# Patient Record
Sex: FEMALE | Race: WHITE
[De-identification: names, ages, dates, MRNs, and addresses within clinical notes are randomized per-mention and may not be internally consistent; named-entity substitution may affect disease eponyms.]

---

## 2018-04-03 ENCOUNTER — HOSPITAL ENCOUNTER (OUTPATIENT)
Dept: HOSPITAL 56 - MW.SDS | Age: 58
Discharge: HOME | End: 2018-04-03
Attending: SURGERY
Payer: COMMERCIAL

## 2018-04-03 DIAGNOSIS — Z87.891: ICD-10-CM

## 2018-04-03 DIAGNOSIS — K57.30: ICD-10-CM

## 2018-04-03 DIAGNOSIS — D12.3: ICD-10-CM

## 2018-04-03 DIAGNOSIS — Z98.890: ICD-10-CM

## 2018-04-03 DIAGNOSIS — Z86.010: ICD-10-CM

## 2018-04-03 DIAGNOSIS — Z79.899: ICD-10-CM

## 2018-04-03 DIAGNOSIS — Z12.11: Primary | ICD-10-CM

## 2018-04-03 DIAGNOSIS — Z88.2: ICD-10-CM

## 2018-04-03 PROCEDURE — 45380 COLONOSCOPY AND BIOPSY: CPT

## 2018-04-03 NOTE — PCM.PREANE
Preanesthetic Assessment





- Anesthesia/Transfusion/Family Hx


Anesthesia History: Prior Anesthesia Without Reaction


Family History of Anesthesia Reaction: No


Transfusion History: No Prior Transfusion(s)


Intubation History: Unknown





- Review of Systems


General: No Symptoms


Pulmonary: No Symptoms


Cardiovascular: No Symptoms


Gastrointestinal: Other (h/o colon polyps 5 years ago)


Neurological: No Symptoms


Other: Reports: None





- Physical Assessment


Height: 1.65 m


Weight: 55.792 kg


ASA Class: 1


Mental Status: Alert & Oriented x3


Airway Class: Mallampati = 2


Dentition: Reports: Normal Dentition


Thyro-Mental Finger Breadths: 3


Mouth Opening Finger Breadths: 3


ROM/Head Extension: Full


Lungs: Clear to Auscultation, Normal Respiratory Effort


Cardiovascular: Regular Rate, Regular Rhythm





- Allergies


Allergies/Adverse Reactions: 


 Allergies











Allergy/AdvReac Type Severity Reaction Status Date / Time


 


Penicillins Allergy  Rash Verified 03/29/18 10:40


 


Sulfa (Sulfonamide Allergy  Other Verified 03/29/18 10:43





Antibiotics)     














- Blood


Blood Available: No





- Anesthesia Plan


Pre-Op Medication Ordered: None





- Acknowledgements


Anesthesia Type Planned: MAC


Pt an Appropriate Candidate for the Planned Anesthesia: Yes


Alternatives and Risks of Anesthesia Discussed w Pt/Guardian: Yes


Pt/Guardian Understands and Agrees with Anesthesia Plan: Yes





PreAnesthesia Questionnaire


Gastrointestinal History: Reports: Colon Polyp


Neurological History: Reports: Headaches, Chronic





- Past Surgical History


GI Surgical History: Reports: Colonoscopy (x2)


Female  Surgical History: Reports: Breast Biopsy





- SUBSTANCE USE


Smoking Status *Q: Former Smoker


Recreational Drug Use History: No





- HOME MEDS


Home Medications: 


 Home Meds





Calcium Carbonate/Vitamin D3 [Calcium 600-Vit D3 400 Tablet] 1 tab PO DAILY 03/ 29/18 [History]


Estrogens, Conjugated [Premarin Vaginal Crm] 1 applic VAG ASDIRECTED 03/29/18 [

History]


L.acidoph,Paracasei, B.lactis [Probiotic] 1 tab PO DAILY 03/29/18 [History]


Tretinoin 1 applic TOP ASDIRECTED 03/29/18 [History]











- CURRENT (IN HOUSE) MEDS


Current Meds: 





 Current Medications





Lactated Ringer's (Ringers, Lactated)  1,000 mls @ 125 mls/hr IV ASDIRECTED BENJIE


   Last Admin: 04/03/18 11:11 Dose:  125 mls/hr


Sodium Chloride (Saline Flush)  10 ml FLUSH ASDIRECTED PRN


   PRN Reason: Keep Vein Open


Sodium Chloride (Saline Flush)  2.5 ml FLUSH ASDIRECTED PRN


   PRN Reason: Keep Vein Open


Sodium Chloride (Saline Flush)  10 ml FLUSH ASDIRECTED PRN


   PRN Reason: Keep Vein Open


Sodium Chloride (Saline Flush)  2.5 ml FLUSH ASDIRECTED PRN


   PRN Reason: Keep Vein Open





Discontinued Medications





Lidocaine HCl (Xylocaine-Mpf 1%) Confirm Administered Dose 5 ml .ROUTE .STK-MED 

ONE


   Stop: 04/03/18 09:51


Propofol (Diprivan  20 Ml) Confirm Administered Dose 400 mg .ROUTE .STK-MED ONE


   Stop: 04/03/18 09:51

## 2018-04-03 NOTE — [id number]
Post Anesthesia Note





- EVALUATION WITHIN 48HRS OF ANESTHETIC


Vital Signs in Normal Range: Yes


Patient Participated in Evaluation: Yes


Respiratory Function Stable: Yes


Airway Patent: Yes


Cardiovascular Function Stable: Yes


Hydration Status Stable: Yes


Pain Control Satisfactory: Yes


Nausea and Vomiting Control Satisfactory: Yes


Mental Status Recovered: Yes


Resp Rate: 11





- COMMENTS/OBSERVATIONS


Free Text/Narrative:: 





no anesthesia problems

## 2018-04-03 NOTE — PCM.OPNOTE
- General Post-Op/Procedure Note


Date of Surgery/Procedure: 04/03/18


Operative Procedure(s): Colonoscopy


Findings: 





transverse colon polyp, diverticulosis 


Pre Op Diagnosis: History of hyperplastic polyps


Post-Op Diagnosis: Transverse colon polyp, diverticulosis


Anesthesia Technique: MAC


Primary Surgeon: Hilda Fung


Condition: Good


Free Text/Narrative:: 


 Intake & Output











 04/02/18 04/03/18 04/03/18





 22:59 06:59 14:59


 


Intake Total   800


 


Balance   800

## 2018-04-03 NOTE — OR
SURGEON:

DANNY CALDERON MD

 

DATE OF PROCEDURE:  04/03/2018

 

PREOPERATIVE DIAGNOSIS:

Screening colonoscopy.

 

POSTOPERATIVE DIAGNOSES:

1. Diverticulosis.

2. Transverse colon polyp.

 

PROCEDURE PERFORMED:

Colonoscopy.

 

ANESTHESIA:

MAC.

 

INSTRUMENT USED:

Olympus colonoscope.

 

EXTENT OF EXAM:

To the cecum.

 

PREPARATION:

Good.

 

LIMITATIONS:

None.

 

INDICATION FOR EXAMINATION:

The patient is a 57-year-old female, who presents for a screening colonoscopy.

We discussed the procedure, expected perioperative course, and risks including

bleeding, infection, damage to surrounding structures including perforation.

The patient verbalized understanding and wishes to proceed.

 

PROCEDURE IN DETAIL:

The patient was brought into the endoscopy suite and placed in the left lateral

decubitus position.  A time-out was completed verifying the patient's name, age,

date of birth, allergies, and procedure to be performed.  Monitored anesthesia

care was induced and continuous oxygen was provided via nasal cannula throughout

the procedure.  After adequate sedation was achieved, a digital rectal exam was

performed.  This exam was within normal limits.  A well lubricated colonoscope

was inserted into the rectum and advanced under direct visualization to the

level of cecum.  Cecum was identified by both visual and anatomic landmarks.  A

photograph was taken of cecal cap as well as with the scope retroflexed within

the cecum.  The scope was then fully withdrawn while examining the color,

texture, anatomy, and integrity of the mucosa from the cecum to the anal canal.

The patient was found to have an approximately 5 mm polyp within the transverse

colon.  This was removed in piecemeal fashion using a cold biopsy forceps.  The

remainder of the colonoscopy was remarkable for diverticulosis throughout the

colon.  The scope was then brought into the rectum and retroflexed to allow

visualization of the anal canal opening.  This appeared normal, and a photograph

was taken.  The scope was then straightened out and fully withdrawn.  The cecum

to anus time was 6 minutes.  The patient was transferred to the PACU in stable

condition.

 

ENDOSCOPIC DIAGNOSES:

1. Diverticulosis.

2. Transverse colon polyp.

 

RECOMMENDATIONS:

Follow up in clinic in 2 weeks.

 

 

SEGUNDO YOUNG

DD:  04/03/2018 16:10:41

DT:  04/03/2018 16:43:22

Job #:  [id number]

## 2023-05-23 ENCOUNTER — HOSPITAL ENCOUNTER (OUTPATIENT)
Dept: HOSPITAL 56 - MW.SDS | Age: 63
Discharge: HOME | End: 2023-05-23
Attending: SURGERY
Payer: COMMERCIAL

## 2023-05-23 DIAGNOSIS — K63.89: ICD-10-CM

## 2023-05-23 DIAGNOSIS — Z87.891: ICD-10-CM

## 2023-05-23 DIAGNOSIS — Z88.2: ICD-10-CM

## 2023-05-23 DIAGNOSIS — Z88.0: ICD-10-CM

## 2023-05-23 DIAGNOSIS — Z79.899: ICD-10-CM

## 2023-05-23 DIAGNOSIS — Z86.010: ICD-10-CM

## 2023-05-23 DIAGNOSIS — K57.30: ICD-10-CM

## 2023-05-23 DIAGNOSIS — F41.9: ICD-10-CM

## 2023-05-23 DIAGNOSIS — D12.3: ICD-10-CM

## 2023-05-23 DIAGNOSIS — Z12.11: Primary | ICD-10-CM

## 2023-05-23 PROCEDURE — 45380 COLONOSCOPY AND BIOPSY: CPT

## 2025-03-14 LAB
ALBUMIN SERPL-MCNC: 3.9 G/DL (ref 3.4–5)
ALBUMIN/GLOB SERPL: 1.3 {RATIO} (ref 0.9–1.6)
ALP SERPL-CCNC: 61 U/L (ref 46–116)
ALT SERPL-CCNC: 24 IU/L (ref 14–63)
AST SERPL-CCNC: 18 IU/L (ref 15–37)
BASOPHILS # BLD AUTO: 0.03 K/UL (ref 0–0.2)
BASOPHILS NFR BLD AUTO: 0.4 % (ref 0–1)
BILIRUB SERPL-MCNC: 0.7 MG/DL (ref 0.2–1)
BUN SERPL-MCNC: 12 MG/DL (ref 7–18)
CALCIUM SERPL-MCNC: 9.6 MG/DL (ref 8.5–10.1)
CHLORIDE SERPL-SCNC: 103 MMOL/L (ref 98–107)
CO2 SERPL-SCNC: 28.9 MMOL/L (ref 21–32)
CREAT CL 24H UR+SERPL-VRATE: 57.46 ML/MIN
CREAT SERPL-MCNC: 0.8 MG/DL (ref 0.6–1)
EGFRCR SERPLBLD CKD-EPI 2021: 81 ML/MIN (ref 60–?)
EOSINOPHIL # BLD AUTO: 0.16 K/UL (ref 0–0.45)
EOSINOPHIL NFR BLD AUTO: 2.1 % (ref 0–6)
GLOBULIN SER-MCNC: 3 G/DL (ref 2.6–4)
GLUCOSE SERPL-MCNC: 99 MG/DL (ref 74–106)
HCT VFR BLD AUTO: 42.6 % (ref 37–47)
HGB BLD-MCNC: 14.3 G/DL (ref 12–16)
IMM GRANULOCYTES # BLD: 0.02 K/UL (ref 0–0.05)
IMM GRANULOCYTES NFR BLD: 0.3 % (ref 0–0.4)
LYMPHOCYTES # BLD AUTO: 1.39 K/UL (ref 1–4.8)
LYMPHOCYTES NFR BLD AUTO: 18.4 % (ref 24–44)
MCH RBC QN AUTO: 32.5 PG (ref 28–32)
MCHC RBC AUTO-ENTMCNC: 33.6 G/DL (ref 32–36)
MCHC RBC AUTO-ENTMCNC: 96.8 FL (ref 83–99)
MONOCYTES # BLD AUTO: 0.84 K/UL (ref 0–0.8)
MONOCYTES NFR BLD AUTO: 11.1 % (ref 0–8)
NEUTROPHILS # BLD AUTO: 5.11 K/UL (ref 1.8–7.7)
NEUTROPHILS NFR BLD AUTO: 67.7 % (ref 41–71)
NRBC BLD AUTO-RTO: 0 /100WBC (ref 0–0.2)
NRBC BLD AUTO-RTO: 0 K/UL (ref 0–0.02)
PLATELET # BLD AUTO: 271 K/UL (ref 150–400)
PMV BLD AUTO: 9.8 FL (ref 9.4–12.3)
POTASSIUM SERPL-SCNC: 3.9 MMOL/L (ref 3.5–5.1)
PROT SERPL-MCNC: 6.9 G/DL (ref 6.4–8.2)
RBC # BLD AUTO: 4.4 M/UL (ref 4.1–5.3)
SODIUM SERPL-SCNC: 141 MMOL/L (ref 136–145)
WBC # BLD AUTO: 7.55 K/UL (ref 3.9–11.3)

## 2025-03-18 ENCOUNTER — HOSPITAL ENCOUNTER (OUTPATIENT)
Dept: HOSPITAL 56 - MW.SDS | Age: 65
Discharge: HOME | End: 2025-03-18
Attending: SURGERY
Payer: MEDICARE

## 2025-03-18 DIAGNOSIS — Z79.899: ICD-10-CM

## 2025-03-18 DIAGNOSIS — Z88.2: ICD-10-CM

## 2025-03-18 DIAGNOSIS — Z45.2: Primary | ICD-10-CM

## 2025-03-18 DIAGNOSIS — Z88.0: ICD-10-CM

## 2025-03-18 DIAGNOSIS — C50.919: ICD-10-CM

## 2025-03-18 PROCEDURE — 36561 INSERT TUNNELED CV CATH: CPT

## 2025-03-18 PROCEDURE — 36415 COLL VENOUS BLD VENIPUNCTURE: CPT

## 2025-03-18 PROCEDURE — 85025 COMPLETE CBC W/AUTO DIFF WBC: CPT

## 2025-03-18 PROCEDURE — 76000 FLUOROSCOPY <1 HR PHYS/QHP: CPT

## 2025-03-18 PROCEDURE — 71045 X-RAY EXAM CHEST 1 VIEW: CPT

## 2025-03-18 PROCEDURE — C1788 PORT, INDWELLING, IMP: HCPCS

## 2025-03-18 PROCEDURE — 80053 COMPREHEN METABOLIC PANEL: CPT

## 2025-03-18 RX ADMIN — HEPARIN SODIUM (PORCINE) LOCK FLUSH IV SOLN 100 UNIT/ML PRN UNITS: 100 SOLUTION at 09:55

## 2025-03-18 RX ADMIN — CLINDAMYCIN PHOSPHATE ONE MLS/HR: 900 INJECTION, SOLUTION INTRAVENOUS at 07:20

## 2025-05-14 ENCOUNTER — HOSPITAL ENCOUNTER (OUTPATIENT)
Dept: HOSPITAL 56 - MW.SDS | Age: 65
Discharge: HOME | End: 2025-05-14
Attending: SURGERY
Payer: MEDICARE

## 2025-05-14 DIAGNOSIS — C50.919: ICD-10-CM

## 2025-05-14 DIAGNOSIS — Z88.0: ICD-10-CM

## 2025-05-14 DIAGNOSIS — Z88.2: ICD-10-CM

## 2025-05-14 DIAGNOSIS — Z45.2: Primary | ICD-10-CM

## 2025-05-14 PROCEDURE — 36590 REMOVAL TUNNELED CV CATH: CPT
